# Patient Record
Sex: MALE | Race: WHITE | NOT HISPANIC OR LATINO | Employment: OTHER | URBAN - NONMETROPOLITAN AREA
[De-identification: names, ages, dates, MRNs, and addresses within clinical notes are randomized per-mention and may not be internally consistent; named-entity substitution may affect disease eponyms.]

---

## 2023-01-19 ENCOUNTER — TELEPHONE (OUTPATIENT)
Dept: FAMILY MEDICINE CLINIC | Age: 28
End: 2023-01-19
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
## 2023-01-20 ENCOUNTER — OFFICE VISIT (OUTPATIENT)
Dept: FAMILY MEDICINE CLINIC | Age: 28
End: 2023-01-20
Payer: MEDICAID

## 2023-01-20 VITALS
TEMPERATURE: 97.3 F | WEIGHT: 185.6 LBS | HEIGHT: 60 IN | OXYGEN SATURATION: 97 % | BODY MASS INDEX: 36.44 KG/M2 | HEART RATE: 109 BPM | DIASTOLIC BLOOD PRESSURE: 90 MMHG | RESPIRATION RATE: 16 BRPM | SYSTOLIC BLOOD PRESSURE: 112 MMHG

## 2023-01-20 DIAGNOSIS — Z76.0 ENCOUNTER FOR MEDICATION REFILL: Primary | ICD-10-CM

## 2023-01-20 DIAGNOSIS — S92.501D CLOSED FRACTURE OF PHALANX OF RIGHT FIFTH TOE WITH ROUTINE HEALING, SUBSEQUENT ENCOUNTER: ICD-10-CM

## 2023-01-20 DIAGNOSIS — R21 RASH IN ADULT: ICD-10-CM

## 2023-01-20 DIAGNOSIS — F31.60 BIPOLAR AFFECTIVE DISORDER, CURRENT EPISODE MIXED, CURRENT EPISODE SEVERITY UNSPECIFIED: ICD-10-CM

## 2023-01-20 DIAGNOSIS — Z87.898 HISTORY OF DEVELOPMENTAL DELAY: ICD-10-CM

## 2023-01-20 DIAGNOSIS — S90.414A ABRASION OF TOE OF RIGHT FOOT, INITIAL ENCOUNTER: ICD-10-CM

## 2023-01-20 DIAGNOSIS — F84.5 ASPERGER SYNDROME: ICD-10-CM

## 2023-01-20 PROCEDURE — 99204 OFFICE O/P NEW MOD 45 MIN: CPT | Performed by: NURSE PRACTITIONER

## 2023-01-20 RX ORDER — QUETIAPINE FUMARATE 200 MG/1
200 TABLET, FILM COATED ORAL 3 TIMES DAILY
Qty: 90 TABLET | Refills: 0 | Status: SHIPPED | OUTPATIENT
Start: 2023-01-20 | End: 2023-02-23 | Stop reason: SDUPTHER

## 2023-01-20 RX ORDER — LORATADINE 10 MG/1
10 TABLET ORAL DAILY
Qty: 30 TABLET | Refills: 0 | Status: SHIPPED | OUTPATIENT
Start: 2023-01-20

## 2023-01-20 RX ORDER — TRAZODONE HYDROCHLORIDE 50 MG/1
50 TABLET ORAL
COMMUNITY
Start: 2023-01-10

## 2023-01-20 RX ORDER — QUETIAPINE FUMARATE 200 MG/1
200 TABLET, FILM COATED ORAL 3 TIMES DAILY
COMMUNITY
Start: 2022-11-23 | End: 2023-01-20 | Stop reason: SDUPTHER

## 2023-01-20 RX ORDER — MELOXICAM 15 MG/1
TABLET ORAL
COMMUNITY
Start: 2023-01-19
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
## 2023-02-23 DIAGNOSIS — F31.60 BIPOLAR AFFECTIVE DISORDER, CURRENT EPISODE MIXED, CURRENT EPISODE SEVERITY UNSPECIFIED: ICD-10-CM

## 2023-02-23 DIAGNOSIS — Z76.0 ENCOUNTER FOR MEDICATION REFILL: ICD-10-CM

## 2023-02-23 RX ORDER — QUETIAPINE FUMARATE 200 MG/1
200 TABLET, FILM COATED ORAL 3 TIMES DAILY
Qty: 90 TABLET | Refills: 0 | Status: SHIPPED | OUTPATIENT
Start: 2023-02-23 | End: 2023-03-27 | Stop reason: SDUPTHER

## 2023-02-23 NOTE — TELEPHONE ENCOUNTER
Caller: LARON CASANOVA    Relationship: Mother    Best call back number: [phone]    Requested Prescriptions:   Requested Prescriptions     Pending Prescriptions Disp Refills   • QUEtiapine (SEROquel) 200 MG tablet 90 tablet 0     Sig: Take 1 tablet by mouth 3 (Three) Times a Day.      Pharmacy where request should be sent: [address] [phone] [facility] [phone] FX     Additional details provided by patient: PT HAS 1 1/2 DAYS LEFT OF MEDICATION.     Does the patient have less than a 3 day supply:  [x] Yes  [] No    Would you like a call back once the refill request has been completed: [] Yes [x] No    If the office needs to give you a call back, can they leave a voicemail: [x] Yes [] No    Jeanette Chandler Rep   02/23/23 09:39 EST

## 2023-02-23 NOTE — TELEPHONE ENCOUNTER
Rx Refill Note  Requested Prescriptions     Pending Prescriptions Disp Refills   • QUEtiapine (SEROquel) 200 MG tablet 90 tablet 0     Sig: Take 1 tablet by mouth 3 (Three) Times a Day.      Last office visit with prescribing clinician: 1/20/2023   Last telemedicine visit with prescribing clinician: Visit date not found   Next office visit with prescribing clinician: Visit date not found                         Would you like a call back once the refill request has been completed: [] Yes [] No    If the office needs to give you a call back, can they leave a voicemail: [] Yes [] No    Renita Dunaway MA  02/23/23, 09:50 EST

## 2023-03-27 DIAGNOSIS — Z76.0 ENCOUNTER FOR MEDICATION REFILL: ICD-10-CM

## 2023-03-27 DIAGNOSIS — F31.60 BIPOLAR AFFECTIVE DISORDER, CURRENT EPISODE MIXED, CURRENT EPISODE SEVERITY UNSPECIFIED: ICD-10-CM

## 2023-03-27 RX ORDER — QUETIAPINE FUMARATE 200 MG/1
TABLET, FILM COATED ORAL
Qty: 90 TABLET | Refills: 0 | OUTPATIENT
Start: 2023-03-27

## 2023-03-27 RX ORDER — QUETIAPINE FUMARATE 200 MG/1
200 TABLET, FILM COATED ORAL 3 TIMES DAILY
Qty: 90 TABLET | Refills: 0 | Status: SHIPPED | OUTPATIENT
Start: 2023-03-27

## 2023-03-27 NOTE — TELEPHONE ENCOUNTER
Rx Refill Note  Requested Prescriptions     Pending Prescriptions Disp Refills   • QUEtiapine (SEROquel) 200 MG tablet 90 tablet 0     Sig: Take 1 tablet by mouth 3 (Three) Times a Day.      Last office visit with prescribing clinician: 1/20/2023   Last telemedicine visit with prescribing clinician: 3/27/2023   Next office visit with prescribing clinician: 3/27/2023                         Would you like a call back once the refill request has been completed: [] Yes [] No    If the office needs to give you a call back, can they leave a voicemail: [] Yes [] No    Renita Dunaway MA  03/27/23, 11:20 EDT

## 2023-03-27 NOTE — TELEPHONE ENCOUNTER
Caller: LARON CASANOVA    Relationship: Mother    Best call back number: [phone]    Requested Prescriptions:   Requested Prescriptions     Pending Prescriptions Disp Refills   • QUEtiapine (SEROquel) 200 MG tablet 90 tablet 0     Sig: Take 1 tablet by mouth 3 (Three) Times a Day.        Pharmacy where request should be sent: [address] [phone] [facility] [phone] FX     Last office visit with prescribing clinician: 1/20/2023   Last telemedicine visit with prescribing clinician: Visit date not found   Next office visit with prescribing clinician: Visit date not found       Does the patient have less than a 3 day supply:  [x] Yes  [] No    Would you like a call back once the refill request has been completed: [] Yes [x] No    If the office needs to give you a call back, can they leave a voicemail: [] Yes [x] No    Jeanette Marrero Rep   03/27/23 10:31 EDT

## 2023-04-14 RX ORDER — TRAZODONE HYDROCHLORIDE 50 MG/1
50 TABLET ORAL
Qty: 30 TABLET | Refills: 1 | Status: SHIPPED | OUTPATIENT
Start: 2023-04-14

## 2023-04-14 NOTE — TELEPHONE ENCOUNTER
Rx Refill Note  Requested Prescriptions     Pending Prescriptions Disp Refills   • traZODone (DESYREL) 50 MG tablet       Sig: Take 1 tablet by mouth every night at bedtime.      Last office visit with prescribing clinician: 1/20/2023   Last telemedicine visit with prescribing clinician: Visit date not found   Next office visit with prescribing clinician: Visit date not found                         Would you like a call back once the refill request has been completed: [] Yes [] No    If the office needs to give you a call back, can they leave a voicemail: [] Yes [] No    Renita Dunaway MA  04/14/23, 14:26 EDT

## 2023-04-14 NOTE — TELEPHONE ENCOUNTER
Caller: LARON CASANOVA    Relationship: Mother    Best call back number: [phone]    Requested Prescriptions:   Requested Prescriptions     Pending Prescriptions Disp Refills   • traZODone (DESYREL) 50 MG tablet       Sig: Take 1 tablet by mouth every night at bedtime.        Pharmacy where request should be sent: [facility] [address] [place] [address] [phone] [facility] [phone] FX     Last office visit with prescribing clinician: 1/20/2023   Last telemedicine visit with prescribing clinician: Visit date not found   Next office visit with prescribing clinician: Visit date not found     Additional details provided by patient: PATIENT HAS A 2 TO 3 DAY SUPPLY LEFT     Does the patient have less than a 3 day supply:  [x] Yes  [] No    Would you like a call back once the refill request has been completed: [] Yes [x] No    If the office needs to give you a call back, can they leave a voicemail: [] Yes [x] No    Jeanette Grcae   04/14/23 13:09 EDT

## 2023-04-25 DIAGNOSIS — F31.60 BIPOLAR AFFECTIVE DISORDER, CURRENT EPISODE MIXED, CURRENT EPISODE SEVERITY UNSPECIFIED: ICD-10-CM

## 2023-04-25 DIAGNOSIS — Z76.0 ENCOUNTER FOR MEDICATION REFILL: ICD-10-CM

## 2023-04-25 RX ORDER — QUETIAPINE FUMARATE 200 MG/1
200 TABLET, FILM COATED ORAL 3 TIMES DAILY
Qty: 90 TABLET | Refills: 0 | Status: SHIPPED | OUTPATIENT
Start: 2023-04-25

## 2023-04-25 NOTE — TELEPHONE ENCOUNTER
Caller: LARON CASANOVA    Relationship: Mother    Best call back number: [phone]     Requested Prescriptions:   Requested Prescriptions     Pending Prescriptions Disp Refills   • QUEtiapine (SEROquel) 200 MG tablet 90 tablet 0     Sig: Take 1 tablet by mouth 3 (Three) Times a Day.        Pharmacy where request should be sent: [address] [phone] [facility] [phone] FX     Last office visit with prescribing clinician: 1/20/2023   Last telemedicine visit with prescribing clinician: Visit date not found   Next office visit with prescribing clinician: Visit date not found     Additional details provided by patient: PATIENT HAS 1 AND A HALF DAY SUPPLY LEFT     Does the patient have less than a 3 day supply:  [x] Yes  [] No    Would you like a call back once the refill request has been completed: [] Yes [x] No    If the office needs to give you a call back, can they leave a voicemail: [] Yes [x] No    Jeanette Grace Rep   04/25/23 13:15 EDT

## 2023-05-25 DIAGNOSIS — Z76.0 ENCOUNTER FOR MEDICATION REFILL: ICD-10-CM

## 2023-05-25 DIAGNOSIS — F31.60 BIPOLAR AFFECTIVE DISORDER, CURRENT EPISODE MIXED, CURRENT EPISODE SEVERITY UNSPECIFIED: ICD-10-CM

## 2023-05-25 RX ORDER — QUETIAPINE FUMARATE 200 MG/1
200 TABLET, FILM COATED ORAL 3 TIMES DAILY
Qty: 90 TABLET | Refills: 0 | Status: SHIPPED | OUTPATIENT
Start: 2023-05-25

## 2023-05-25 NOTE — TELEPHONE ENCOUNTER
Rx Refill Note    PROTOCOLS NOT MET     Requested Prescriptions     Pending Prescriptions Disp Refills   • QUEtiapine (SEROquel) 200 MG tablet 90 tablet 0     Sig: Take 1 tablet by mouth 3 (Three) Times a Day.      Last office visit with prescribing clinician: 1/20/2023      Next office visit with prescribing clinician: NONE           Yue Garcia LPN  05/25/23, 12:07 EDT

## 2023-05-25 NOTE — TELEPHONE ENCOUNTER
Caller: LARON CASANOVA    Relationship: Mother    Best call back number: [phone]    Requested Prescriptions:   Requested Prescriptions     Pending Prescriptions Disp Refills   • QUEtiapine (SEROquel) 200 MG tablet 90 tablet 0     Sig: Take 1 tablet by mouth 3 (Three) Times a Day.        Pharmacy where request should be sent: [address] [phone] [facility] [phone]      Last office visit with prescribing clinician: 1/20/2023   Last telemedicine visit with prescribing clinician: Visit date not found   Next office visit with prescribing clinician: Visit date not found     Additional details provided by patient: PATIENT'S MOTHER (ON BH VERBAL) STATES THAT PATIENT HAS 2 DAY SUPPLY OF THIS MEDICATION.    Does the patient have less than a 3 day supply:  [x] Yes  [] No    Would you like a call back once the refill request has been completed: [] Yes [x] No    If the office needs to give you a call back, can they leave a voicemail: [] Yes [x] No    PLEASE ADVISE.    Jeanette Ramirez Rep   05/25/23 11:54 EDT

## 2023-07-31 ENCOUNTER — TELEPHONE (OUTPATIENT)
Dept: FAMILY MEDICINE CLINIC | Age: 28
End: 2023-07-31
Payer: MEDICAID

## 2023-08-25 NOTE — TELEPHONE ENCOUNTER
Caller: LARON CASANOVA    Relationship: Mother    Best call back number: [phone]     Requested Prescriptions:   Requested Prescriptions     Pending Prescriptions Disp Refills    traZODone (DESYREL) 50 MG tablet 30 tablet 1     Sig: Take 1 tablet by mouth every night at bedtime.        Pharmacy where request should be sent: [facility] [address] [place] [address] [phone] [facility] [phone]     Last office visit with prescribing clinician: 1/20/2023   Last telemedicine visit with prescribing clinician: Visit date not found   Next office visit with prescribing clinician: Visit date not found       Does the patient have less than a 3 day supply:  [x] Yes  [] No    Jeanette Palma Rep   08/25/23 12:20 EDT

## 2023-08-28 RX ORDER — TRAZODONE HYDROCHLORIDE 50 MG/1
50 TABLET ORAL
Qty: 30 TABLET | Refills: 1 | Status: SHIPPED | OUTPATIENT
Start: 2023-08-28

## 2023-09-06 ENCOUNTER — TELEPHONE (OUTPATIENT)
Dept: FAMILY MEDICINE CLINIC | Facility: CLINIC | Age: 28
End: 2023-09-06

## 2023-09-06 DIAGNOSIS — F31.60 BIPOLAR AFFECTIVE DISORDER, CURRENT EPISODE MIXED, CURRENT EPISODE SEVERITY UNSPECIFIED: ICD-10-CM

## 2023-09-06 DIAGNOSIS — Z76.0 ENCOUNTER FOR MEDICATION REFILL: ICD-10-CM

## 2023-09-06 RX ORDER — QUETIAPINE FUMARATE 200 MG/1
200 TABLET, FILM COATED ORAL 3 TIMES DAILY
Qty: 90 TABLET | Refills: 0 | Status: SHIPPED | OUTPATIENT
Start: 2023-09-06

## 2023-09-06 NOTE — TELEPHONE ENCOUNTER
Caller: LARON CASANOVA    Relationship: Mother    Best call back number: [phone]     What is the best time to reach you: ANY    Who are you requesting to speak with (clinical staff, provider,  specific staff member): CLINICAL        What was the call regarding:  PATIENT'S MOM CALL DUE TO PATIENT BEING OUT OF HIS QUETIAPINE. SHE STATES HE NEEDS IT EVERY DAY. HE IS TO SEE DR REN ON THE 09/13 FOR POSSIBLE MEDICINE CHANGE BUT NEEDS QUETIAPINE UNTIL HE GOES      [facility] [phone]

## 2023-09-06 NOTE — TELEPHONE ENCOUNTER
HUB TO READ    I spoke with patient's mother this morning and we discuss his upcoming appointment with his other doc. Patient's mother was informed that PCP was out of office today and would return tomorrow. I informed her that the medication would be pended to the provider to see about having it refilled however, it would be tomorrow before it would be addressed due to pcp being off today.

## 2023-09-06 NOTE — TELEPHONE ENCOUNTER
Rx Refill Note  Requested Prescriptions     Pending Prescriptions Disp Refills    QUEtiapine (SEROquel) 200 MG tablet 90 tablet 0     Sig: Take 1 tablet by mouth 3 (Three) Times a Day.      Last office visit with prescribing clinician: 1/20/2023   Last telemedicine visit with prescribing clinician: Visit date not found   Next office visit with prescribing clinician: Visit date not found                         Would you like a call back once the refill request has been completed: [] Yes [] No    If the office needs to give you a call back, can they leave a voicemail: [] Yes [] No    Mae Guzmán MA  09/06/23, 08:14 EDT      I spoke with patient's mother regarding this medication. She informed me that the patient has an appointment with Dr. Zohaib Chan on 9/13/23 to discuss medication change. Medication refill has been sent in with 0 refills until Dr. Chan finds an appropriate alternative as discussed.

## 2023-09-13 ENCOUNTER — OFFICE VISIT (OUTPATIENT)
Dept: FAMILY MEDICINE CLINIC | Facility: CLINIC | Age: 28
End: 2023-09-13
Payer: MEDICAID

## 2023-09-13 VITALS
OXYGEN SATURATION: 99 % | RESPIRATION RATE: 18 BRPM | SYSTOLIC BLOOD PRESSURE: 108 MMHG | WEIGHT: 186.6 LBS | HEIGHT: 66 IN | DIASTOLIC BLOOD PRESSURE: 66 MMHG | HEART RATE: 72 BPM | BODY MASS INDEX: 29.99 KG/M2

## 2023-09-13 DIAGNOSIS — F90.9 ATTENTION DEFICIT HYPERACTIVITY DISORDER (ADHD), UNSPECIFIED ADHD TYPE: ICD-10-CM

## 2023-09-13 DIAGNOSIS — L24.81 IRRITANT CONTACT DERMATITIS DUE TO METALS: ICD-10-CM

## 2023-09-13 DIAGNOSIS — R62.50 DEVELOPMENTAL DELAY: ICD-10-CM

## 2023-09-13 DIAGNOSIS — F84.0 AUTISM SPECTRUM DISORDER: Primary | ICD-10-CM

## 2023-09-13 DIAGNOSIS — Z72.0 TOBACCO USE: ICD-10-CM

## 2023-09-13 PROBLEM — S92.501A FRACTURE OF FIFTH TOE, RIGHT, CLOSED: Status: ACTIVE | Noted: 2023-09-13

## 2023-09-13 RX ORDER — HYDROXYZINE HYDROCHLORIDE 25 MG/1
25 TABLET, FILM COATED ORAL 3 TIMES DAILY PRN
Qty: 30 TABLET | Refills: 1 | Status: SHIPPED | OUTPATIENT
Start: 2023-09-13 | End: 2023-09-15 | Stop reason: SDUPTHER

## 2023-09-13 NOTE — PROGRESS NOTES
Chief Complaint  ADHD, Autistic Spectrum, and Developmental Delay (Father has a letter with him today, that he needs renewed.  Also wants to discuss medications.)    HPI:    Reji Mendes presents to [facility]    Patient is a 28-year-old male presenting to establish care with new PCP.    Patient with a history of ADHD, autism spectrum disorder, and developmental delay.  Previously patient has been to [facility] and had been on various psychiatric medications.  Patient's father who is one of his caregivers, reports that he has been having recent outbursts with more inappropriate comments.  He is also had increased behavioral issues with recent transition and frequent moves.  He has been stable on his current doses of Seroquel and trazodone for approximately 6 years, although father would like him reevaluated. Needs a letter for parents saying patient is currently under my care. Most recently had followed with NP at another providers office.      He recently had a rash on stomach and also has spread to abdomen. It has been present for about 4-5 months. He has continued to pick. It is not painful but he itches. Denies fever, chills, nausea, vomiting. No sick contacts.     Social: Parents care for him  and are both on disability    Alcohol, Tobacco, and Recreational Drug use: Pack per day smoker and rare alcohol, no recreational drug use        Review of Systems:  ROS negative unless otherwise noted in HPI above.    Past Medical History:   Diagnosis Date    ADHD (attention deficit hyperactivity disorder)     Autism     Developmental delay          Current Outpatient Medications:     QUEtiapine (SEROquel) 200 MG tablet, Take 1 tablet by mouth 3 (Three) Times a Day., Disp: 90 tablet, Rfl: 0    traZODone (DESYREL) 50 MG tablet, Take 1 tablet by mouth every night at bedtime., Disp: 30 tablet, Rfl: 1    hydrOXYzine (ATARAX) 25 MG tablet, Take 1 tablet by mouth 3 (Three)  "Times a Day As Needed for Itching., Disp: 30 tablet, Rfl: 1    Social History     Socioeconomic History    Marital status: Single   Tobacco Use    Smoking status: Every Day     Packs/day: 1.00     Types: Cigarettes    Smokeless tobacco: Never   Substance and Sexual Activity    Alcohol use: Not Currently    Drug use: Yes     Types: Marijuana        Objective   Vital Signs:  /66   Pulse 72   Resp 18   Ht 166.4 cm (65.5\")   Wt 84.6 kg (186 lb 9.6 oz)   SpO2 99%   BMI 30.58 kg/m²   Estimated body mass index is 30.58 kg/m² as calculated from the following:    Height as of this encounter: 166.4 cm (65.5\").    Weight as of this encounter: 84.6 kg (186 lb 9.6 oz).    Physical Exam:  General: Well-appearing patient, no apparent distress  HEENT: No posterior pharynx erythema, no tonsillar erythema or exudates, normal external auditory canals, TM normal without bulging or erythema  Neck: No cervical lymphadenopathy  Cardiac: Regular rate and rhythm, normal S1/S2, no murmur, rubs or gallops, no lower extremity edema  Lungs: Clear to auscultation bilaterally, no crackles or wheezes  Abdomen: Soft, non-tender, no guarding or rebound tenderness, no hepatosplenomegaly  Skin: Diffuse rash of lower abdomen  MSK: Grossly normal tone and strength  Neuro: Alert and oriented x3, CN II-XII grossly intact  Psych: Patient with frequent outburst and inappropriate questioning and comment.    Assessment and Plan:    (F84.0) Autism spectrum disorder   (F90.9) Attention deficit hyperactivity disorder (ADHD), unspecified ADHD type   (R62.50) Developmental delay   Assessment: Patient with longstanding developmental and psych disorders recently has been on stable Seroquel and trazodone dose; however, he has previously been to multiple mental institutions and been on multiple psychoactive medications.  Father would like patient to be further evaluated as an adult as he is having more frequent outburst, concerning behavior, and has not " been reportedly formally assessed since childhood.  I agree with assessment, although referral likely limited by patient's insurance and transportation.  Plan:  - Ambulatory Referral to Psychiatry  - Continue Seroquel 200 mg 3 times daily  - Continue trazodone 50 milligrams nightly  - Try to research possible psych referrals  - Letter provided stating patient is currently under provider's care    (L24.81) Irritant contact dermatitis due to metals  Assessment: Diffuse dermatitis near belt buckle and on arms most likely secondary to contact dermatitis from metal in rings and belt buckle.  Discussed likely cause and the importance of avoiding metal.  Plan:  - Avoid metal rings and belt buckle  - Hydroxyzine 25 mg TID PRN    (Z72.0) Tobacco use   Assessment: Patient continues to smoke approximately a pack per day.    Plan:   - Encourage complete tobacco cessation    I spent 46 minutes caring for Reji on this date of service. This time includes time spent by me in the following activities:preparing for the visit, reviewing tests, obtaining and/or reviewing a separately obtained history, performing a medically appropriate examination and/or evaluation , counseling and educating the patient/family/caregiver, referring and communicating with other health care professionals , and care coordination    Patient was given instructions and counseling regarding his condition or for health maintenance advice. Please see specific information pulled into the AVS if appropriate.       Dr Zohaib Chan   Internal Medicine Physician  [facility]  [address], [address]  [place] [address]

## 2023-09-13 NOTE — PATIENT INSTRUCTIONS
Refilled medication    Continue current medications as prescribed    Will get back to you regarding referral and best options    Follow up as needed.

## 2023-09-15 ENCOUNTER — TELEPHONE (OUTPATIENT)
Dept: FAMILY MEDICINE CLINIC | Facility: CLINIC | Age: 28
End: 2023-09-15
Payer: MEDICAID

## 2023-09-15 RX ORDER — HYDROXYZINE HYDROCHLORIDE 25 MG/1
25 TABLET, FILM COATED ORAL 3 TIMES DAILY PRN
Qty: 30 TABLET | Refills: 1 | Status: SHIPPED | OUTPATIENT
Start: 2023-09-15

## 2023-09-15 NOTE — TELEPHONE ENCOUNTER
Letter was previously completed during office visit on 9/13/2023 and is under the letters tab in patient's chart.    Zohaib Chan MD

## 2023-09-15 NOTE — TELEPHONE ENCOUNTER
Caller: LARON CASANOVA    Relationship: Mother    Best call back number: [phone]    What form or medical record are you requesting: LETTER ALLOWING MOTHER AND FATHER TO STAY HOME AND CARE FOR PATIENT DUE TO HEALTH CONDITIONS AS DISCUSSED AT 9/13/2023 OFFICE VISIT    Who is requesting this form or medical record from you: MOTHER AND FATHER    How would you like to receive the form or medical records (pick-up, mail, fax): MAIL  [address]  [place] [address]

## 2023-09-15 NOTE — TELEPHONE ENCOUNTER
Pharmacy Name: [facility] - [place], [place] - [address] [phone] [facility] [phone] FX     What medication are you calling in regards to:   hydrOXYzine (ATARAX) 25 MG tablet     What question does the pharmacy have: HAS NOT RECEIVED SCRIPT SENT ON 9/13/2023

## 2023-10-02 ENCOUNTER — TELEPHONE (OUTPATIENT)
Dept: FAMILY MEDICINE CLINIC | Facility: CLINIC | Age: 28
End: 2023-10-02
Payer: MEDICAID

## 2023-10-02 DIAGNOSIS — F31.60 BIPOLAR AFFECTIVE DISORDER, CURRENT EPISODE MIXED, CURRENT EPISODE SEVERITY UNSPECIFIED: ICD-10-CM

## 2023-10-02 DIAGNOSIS — Z76.0 ENCOUNTER FOR MEDICATION REFILL: ICD-10-CM

## 2023-10-02 NOTE — TELEPHONE ENCOUNTER
Caller:   LARON CASANOVA (Mother) [phone] (Mobile)       Relationship:       What medication are you requesting:   QUEtiapine (SEROquel) 200 MG tablet  200 mg, 3 Times Daily 0 ordered          Summary: Take 1 tablet by mouth 3 (Three) Times a Day.          What are your current symptoms:     How long have you been experiencing symptoms:     Have you had these symptoms before:    [x] Yes  [] No    Have you been treated for these symptoms before:   [x] Yes  [] No    If a prescription is needed, what is your preferred pharmacy and phone number: [address]  [place]  [phone] [facility] [phone] FX     Additional notes:

## 2023-10-03 ENCOUNTER — TELEPHONE (OUTPATIENT)
Dept: FAMILY MEDICINE CLINIC | Facility: CLINIC | Age: 28
End: 2023-10-03
Payer: MEDICAID

## 2023-10-03 RX ORDER — QUETIAPINE FUMARATE 200 MG/1
200 TABLET, FILM COATED ORAL 3 TIMES DAILY
Qty: 90 TABLET | Refills: 0 | Status: SHIPPED | OUTPATIENT
Start: 2023-10-03

## 2023-10-03 NOTE — TELEPHONE ENCOUNTER
Patient's mom called to say patient needs a PA on his generic Seroquel and [facility] in [place] has already faxed us for this.  Patient is currently out of medication.

## 2023-10-03 NOTE — TELEPHONE ENCOUNTER
No further questions    Name: LARON CASANOVA       Relationship:MOTHER       Best Callback Number: [phone]          HUB PROVIDED THE RELAY MESSAGE FROM THE OFFICE    PATIENT VOICED UNDERSTANDING AND HAS NO FURTHER QUESTIONS AT THIS TIME    ADDITIONAL INFORMATION:

## 2023-10-03 NOTE — TELEPHONE ENCOUNTER
RELAY    Left detailed message on Mom's voice mail at 4:29pm that the PA was approved and we are faxing it to the pharmacy today.

## 2023-10-16 RX ORDER — TRAZODONE HYDROCHLORIDE 50 MG/1
50 TABLET ORAL
Qty: 30 TABLET | Refills: 1 | Status: SHIPPED | OUTPATIENT
Start: 2023-10-16

## 2023-10-16 NOTE — TELEPHONE ENCOUNTER
Caller: LARON CASANOVA    Relationship: Mother    Best call back number: [phone]     Requested Prescriptions:   Requested Prescriptions     Pending Prescriptions Disp Refills    traZODone (DESYREL) 50 MG tablet 30 tablet 1     Sig: Take 1 tablet by mouth every night at bedtime.        Pharmacy where request should be sent: [facility] [address] [place] [address] [phone] [facility] [phone] FX     Last office visit with prescribing clinician: 9/13/2023   Last telemedicine visit with prescribing clinician: Visit date not found   Next office visit with prescribing clinician: Visit date not found     Additional details provided by patient: PATIENT UNABLE TO SEE SPECIALIST UNTIL 10/25, PATIENT IS CURRENTLY OUT OF MEDICATION     Does the patient have less than a 3 day supply:  [x] Yes  [] No    Would you like a call back once the refill request has been completed: [] Yes [] No    If the office needs to give you a call back, can they leave a voicemail: [] Yes [] No    Jeanette Babin Rep   10/16/23 14:26 EDT

## 2023-10-30 DIAGNOSIS — Z76.0 ENCOUNTER FOR MEDICATION REFILL: ICD-10-CM

## 2023-10-30 DIAGNOSIS — F31.60 BIPOLAR AFFECTIVE DISORDER, CURRENT EPISODE MIXED, CURRENT EPISODE SEVERITY UNSPECIFIED: ICD-10-CM

## 2023-10-30 NOTE — TELEPHONE ENCOUNTER
Caller: LARON CASANOVA    Relationship: Mother    Best call back number:[phone]     Requested Prescriptions:   Requested Prescriptions     Pending Prescriptions Disp Refills    QUEtiapine (SEROquel) 200 MG tablet 90 tablet 0     Sig: Take 1 tablet by mouth 3 (Three) Times a Day.        Pharmacy where request should be sent: [address] [phone] [facility] [phone] FX     Last office visit with prescribing clinician: 9/13/2023   Last telemedicine visit with prescribing clinician: Visit date not found   Next office visit with prescribing clinician: Visit date not found     Additional details provided by patient: PATIENT'S MOM IS ASKING FOR THIS MEDICATION TO BE REFILLED BY PCP UNTIL THEY ARE ABLE TO GET HIM IN TO THE SPECIALIST ON 12/6/2023.    Does the patient have less than a 3 day supply:  [x] Yes  [] No    Would you like a call back once the refill request has been completed: [x] Yes [] No    If the office needs to give you a call back, can they leave a voicemail: [x] Yes [] No    Jeanette Armstrong Rep   10/30/23 14:08 EDT

## 2023-10-31 RX ORDER — QUETIAPINE FUMARATE 200 MG/1
200 TABLET, FILM COATED ORAL 3 TIMES DAILY
Qty: 90 TABLET | Refills: 0 | Status: SHIPPED | OUTPATIENT
Start: 2023-10-31

## 2023-11-25 DIAGNOSIS — Z76.0 ENCOUNTER FOR MEDICATION REFILL: ICD-10-CM

## 2023-11-25 DIAGNOSIS — F31.60 BIPOLAR AFFECTIVE DISORDER, CURRENT EPISODE MIXED, CURRENT EPISODE SEVERITY UNSPECIFIED: ICD-10-CM

## 2023-11-25 RX ORDER — QUETIAPINE FUMARATE 200 MG/1
200 TABLET, FILM COATED ORAL 3 TIMES DAILY
Qty: 90 TABLET | Refills: 0 | Status: SHIPPED | OUTPATIENT
Start: 2023-11-25

## 2024-01-12 ENCOUNTER — TELEPHONE (OUTPATIENT)
Dept: FAMILY MEDICINE CLINIC | Facility: CLINIC | Age: 29
End: 2024-01-12
Payer: MEDICAID

## 2024-01-12 NOTE — TELEPHONE ENCOUNTER
Caller: LARON CASANOVA    Relationship: Mother    Best call back number: [phone]    What medication are you requesting: PLEASE ADVISE    What are your current symptoms: EAR ACHE    How long have you been experiencing symptoms: 1 DAY    Have you had these symptoms before:    [] Yes  [x] No    Have you been treated for these symptoms before:   [] Yes  [x] No    If a prescription is needed, what is your preferred pharmacy and phone number: [place] [id number] [facility] [phone] [facility] [phone]      Additional notes:    PATIENT HAS NO WAY TO GET TO THE OFFICE FOR AN APPOINTMENT.     PLEASE CALL TO CONFIRM OR DISCUSS.

## 2024-01-12 NOTE — TELEPHONE ENCOUNTER
Gave message to patient's mother Gelema at 1:59pm.  She said they have no car to get patient here.  I recommended the [facility].  She said they will get something over the counter and try it.